# Patient Record
(demographics unavailable — no encounter records)

---

## 2024-11-04 NOTE — HISTORY OF PRESENT ILLNESS
[Influenza] : Influenza [FreeTextEntry1] :  Administered IM flu injection on left thigh. Pt tolerated well. VIS statement given.

## 2024-11-22 NOTE — DEVELOPMENTAL MILESTONES
[Normal Development] : Normal Development [Looks for hidden objects] : looks for hidden objects [Imitates new gestures] : imitates new gestures [Uses one word other than Mom or] : uses one word other than Mom or Dad or personal names [Follows a verbal command that] : follows a verbal command that includes a gesture [Drops object in a cup] : drops object in a cup [Picks up small object with 2 finger] : picks up small object with 2 finger pincer grasp [Picks up food and eats it] : picks up food and eats it [No] : Not Completed. [Says "Dad" or "Mom" with meaning] : does not say "Dad" or "Mom" with meaning [Takes first independent] : does not take first independent steps [Stands without support] : does not stand without support

## 2024-11-22 NOTE — PHYSICAL EXAM
[Alert] : alert [Normocephalic] : normocephalic [Closed Anterior [place]] : closed anterior fontanelle [Red Reflex] : red reflex bilateral [PERRL] : PERRL [Normally Placed Ears] : normally placed ears [Auricles Well Formed] : auricles well formed [Clear Tympanic membranes] : clear tympanic membranes [Light reflex present] : light reflex present [Bony landmarks visible] : bony landmarks visible [Discharge] : no discharge [Nares Patent] : nares patent [Palate Intact] : palate intact [Uvula Midline] : uvula midline [Tooth Eruption] : tooth eruption [Supple, full passive range of motion] : supple, full passive range of motion [Palpable Masses] : no palpable masses [Symmetric Chest Rise] : symmetric chest rise [Clear to Auscultation Bilaterally] : clear to auscultation bilaterally [Regular Rate and Rhythm] : regular rate and rhythm [S1, S2 present] : S1, S2 present [Murmurs] : no murmurs [+2 Femoral Pulses] : (+) 2 femoral pulses [Soft] : soft [Tender] : nontender [Distended] : nondistended [Bowel Sounds] : normoactive bowel sounds [Hepatomegaly] : no hepatomegaly [Splenomegaly] : no splenomegaly [Central Urethral Opening] : central urethral opening [Testicles Descended] : testicles descended bilaterally [No Abnormal Lymph Nodes Palpated] : no abnormal lymph nodes palpated [Symmetric Abduction and Rotation of Hips] : symmetric abduction and rotation of hips [Allis Sign] : negative Allis sign [Straight] : straight [Cranial Nerves Grossly Intact] : cranial nerves grossly intact [Rash or Lesions] : no rash/lesions

## 2024-11-22 NOTE — HISTORY OF PRESENT ILLNESS
[Mother] : mother [Father] : father [Formula ___ oz/feed] : [unfilled] oz of formula per feed [Cow's milk ___ oz/feed] : [unfilled] oz of Cow's milk per feed [Fruit] : fruit [Vegetables] : vegetables [Meat] : meat [Dairy] : dairy [___ stools per day] : [unfilled]  stools per day [Yellow] : stools are yellow color [___ voids per day] : [unfilled] voids per day [Normal] : Normal [On back] : On back [In crib] : In crib [Sippy cup use] : Sippy cup use [Brushing teeth] : Brushing teeth [No] : Not at  exposure [Water heater temperature set at <120 degrees F] : Water heater temperature set at <120 degrees F [Car seat in back seat] : Car seat in back seat [Smoke Detectors] : Smoke detectors [Carbon Monoxide Detectors] : Carbon monoxide detectors [NO] : No [PCV 20] : PCV 20 [Hepatitis A] : Hepatitis A [Exposure to electronic nicotine delivery system] : No exposure to electronic nicotine delivery system [At risk for exposure to TB] : Not at risk for exposure to Tuberculosis [de-identified] : 16 oz cows milk, 3 meals a day [FreeTextEntry3] : 12 hours [de-identified] : hasnt seen dentist yet, wipes teeth at night

## 2024-11-22 NOTE — END OF VISIT
[FreeTextEntry3] : Case seen, discussed and examined with Bennie OSMAN student CELINA Nunez Agree with plan Leonides Vences MD

## 2024-11-22 NOTE — DISCUSSION/SUMMARY
[Normal Growth] : growth [Normal Development] : development [None] : No known medical problems [No Elimination Concerns] : elimination [No Feeding Concerns] : feeding [No Skin Concerns] : skin [Normal Sleep Pattern] : sleep [Family Support] : family support [Establishing Routines] : establishing routines [Feeding and Appetite Changes] : feeding and appetite changes [Establishing A Dental Home] : establishing a dental home [Safety] : safety [No Medications] : ~He/She~ is not on any medications [Parent/Guardian] : parent/guardian [] : The components of the vaccine(s) to be administered today are listed in the plan of care. The disease(s) for which the vaccine(s) are intended to prevent and the risks have been discussed with the caretaker.  The risks are also included in the appropriate vaccination information statements which have been provided to the patient's caregiver.  The caregiver has given consent to vaccinate. [FreeTextEntry1] :  12mo M presenting for WCC. Growth and height in 60%tile and 69%tile respectively. No acute growth or developmental concerns. Discussed and counseled on components of healthy active living with family. Recommended to continue with 6-12oz of Cow's milk daily and d/c formula. Continue 3 meals a day with 2-3 snacks in-between. Parents advised to schedule appointment for Pt's first dental appointment. Parents instructed about brushing with toothpaste BID, or an alternative fluoride source. Discussed Pt's developmental milestones. Counseled and educated parent's on importance of reading stories to baby. Encourage walking with walker toys. Help baby to maintain consistent daily routines and sleep schedule. Recognize stranger anxiety. Ensure home is safe since baby is increasingly mobile. Be within arm's reach of baby at all times. Use consistent, positive discipline. Avoid screen time. Read aloud to baby.  Risks and benefits of vaccines administered today have been discussed with parents. Today Varicella and MMR were administered. Parents instructed to return before 15mo WCC to receive Hep A and PCV20. MOC & FOC verbalized understanding and agreement with all aspects of discussion and plan.  SDOH domains were screened and scored. Advised to schedule RTO for 15mo WCC.

## 2025-02-18 NOTE — PHYSICAL EXAM
[Alert] : alert [No Acute Distress] : no acute distress [Normocephalic] : normocephalic [Anterior [place] Closed] : anterior fontanelle closed [Red Reflex Bilateral] : red reflex bilateral [PERRL] : PERRL [Normally Placed Ears] : normally placed ears [Auricles Well Formed] : auricles well formed [Clear Tympanic membranes with present light reflex and bony landmarks] : clear tympanic membranes with present light reflex and bony landmarks [No Discharge] : no discharge [Nares Patent] : nares patent [Palate Intact] : palate intact [Uvula Midline] : uvula midline [Tooth Eruption] : tooth eruption  [Supple, full passive range of motion] : supple, full passive range of motion [No Palpable Masses] : no palpable masses [Symmetric Chest Rise] : symmetric chest rise [Clear to Auscultation Bilaterally] : clear to auscultation bilaterally [Regular Rate and Rhythm] : regular rate and rhythm [S1, S2 present] : S1, S2 present [No Murmurs] : no murmurs [+2 Femoral Pulses] : +2 femoral pulses [Soft] : soft [NonTender] : non tender [Non Distended] : non distended [Normoactive Bowel Sounds] : normoactive bowel sounds [No Hepatomegaly] : no hepatomegaly [No Splenomegaly] : no splenomegaly [Central Urethral Opening] : central urethral opening [Testicles Descended Bilaterally] : testicles descended bilaterally [Patent] : patent [Normally Placed] : normally placed [No Abnormal Lymph Nodes Palpated] : no abnormal lymph nodes palpated [No Clavicular Crepitus] : no clavicular crepitus [Negative Perdue-Ortalani] : negative Perdue-Ortalani [Symmetric Buttocks Creases] : symmetric buttocks creases [No Spinal Dimple] : no spinal dimple [NoTuft of Hair] : no tuft of hair [Cranial Nerves Grossly Intact] : cranial nerves grossly intact [de-identified] : dry patches of skin on thighs and R shoulder

## 2025-02-18 NOTE — DEVELOPMENTAL MILESTONES
[Drinks from cup with little] : drinks from cup with little spilling [Points to ask for something] : points to ask for something or to get help [Uses 3 words other than names] : uses 3 words other than names [Follows directions that do not] : follows direction that do not include a gesture [Begins to run] : begins to run [Drops object into and takes object] : drops object into and takes object out of container [Yes] : Completed. [FreeTextEntry1] : Survey of Wellbeing of Young Children (SWYC) at 15 months Developmental Milestones 12 (<9 refer to Early Intervention, 10 below average, 11-17 average, 18+ above average). Baby Pediatric Symptom Checklist 0 on inflexibility, 0 on irritability, 1 on routines (normal <3 on any domain)

## 2025-02-18 NOTE — HISTORY OF PRESENT ILLNESS
[Parents] : parents [Normal] : Normal [Brushing teeth] : Brushing teeth [Tap water] : Primary Fluoride Source: Tap water [No] : No cigarette smoke exposure [Car seat in back seat] : Car seat in back seat [Carbon Monoxide Detectors] : Carbon monoxide detectors [Smoke Detectors] : Smoke detectors [Up to date] : Up to date [NO] : No [Exposure to electronic nicotine delivery system] : No exposure to electronic nicotine delivery system [de-identified] : Eats all foods. Drinks water, milk 16 oz. [FreeTextEntry3] : Sleeps 8p-8a. Naps.

## 2025-02-18 NOTE — DISCUSSION/SUMMARY
[Normal Growth] : growth [Normal Development] : development [None] : No known medical problems [No Elimination Concerns] : elimination [No Feeding Concerns] : feeding [No Skin Concerns] : skin [Normal Sleep Pattern] : sleep [Communication and Social Development] : communication and social development [Sleep Routines and Issues] : sleep routines and issues [Temper Tantrums and Discipline] : temper tantrums and discipline [Healthy Teeth] : healthy teeth [Safety] : safety [No Medications] : ~He/She~ is not on any medications [Parent/Guardian] : parent/guardian [] : The components of the vaccine(s) to be administered today are listed in the plan of care. The disease(s) for which the vaccine(s) are intended to prevent and the risks have been discussed with the caretaker.  The risks are also included in the appropriate vaccination information statements which have been provided to the patient's caregiver.  The caregiver has given consent to vaccinate. [FreeTextEntry1] :  15 month old here for [facility] Doing well Vaccines - DTaP, HIB  Dry skin- Take short baths (less than 5 minutes) and avoid hot water, 2-3x/week, use soap right at the end, minimal amount - only on areas where truly needed. Use gentle cleanser such as Aveeno fragrance free. No bubble baths. Child may play in plain bath water for 2-3 minutes, then use soap/shampoo, and take child out of bath immediately. Do not let child sit in sudsy bath water. Pat dry only after coming out of bath/shower. Moisturize right after bath time with plain fragrance-free emollient such as plain Vaseline or CeraVe. Reapply moisturizer frequently throughout the day, at least 3-4x/day. Apply plain Vaseline to face prior to mealtimes. Use detergent such as All Free and Clear or Tide Free and Gentle. Do not use fabric softener.  Continue whole cow's milk. Continue table foods, 3 meals with 2-3 snacks per day.  Incorporate fluorinated water daily in a sippy cup.  Brush teeth twice a day with soft toothbrush. Recommend visit to dentist.  When in car, keep child in rear-facing car seats until age 2, or until  the maximum height and weight for seat is reached.  Put baby to sleep in own crib. Lower crib mattress.  Help baby to maintain consistent daily routines and sleep schedule.  Recognize stranger and separation anxiety.  Ensure home is safe since baby is increasingly mobile.  Be within arm's reach of baby at all times.  Use consistent, positive discipline.  Read aloud to baby.   All questions answered. RTC in 3 months for [facility]

## 2025-05-23 NOTE — DEVELOPMENTAL MILESTONES
[Normal Development] : Normal Development [None] : none [Help dress and undress self] : help dress and undress self [Points to pictures in book] : points to pictures in book [Points to object of interest to] : points to object of interest to draw attention to it [Turns and looks at adult if] : turns and looks at adult if something new happens [Begins to scoop with spoon] : begins to scoop with spoon [Uses 6 to 10 words other than] : uses 6 to 10 words other than names [Identifies at least 2 body parts] : identifies at least 2 body parts [Sits in small chair] : sits in small chair [Carries toy while walking] : carries toy while walking [Scribbles spontaneously] : scribbles spontaneously [Throws small ball a few feet] : throws a small ball a few feet while standing [Passed] : passed [Walks up with 2 feet per step] : does not walk up with 2 feet per step with hand held [FreeTextEntry1] : 2

## 2025-05-23 NOTE — HISTORY OF PRESENT ILLNESS
[Mother] : mother [Father] : father [Cow's milk (Ounces per day ___)] : consumes [unfilled] oz of Cow's milk per day [Fruit] : fruit [Vegetables] : vegetables [Meat] : meat [Eggs] : eggs [Finger Foods] : finger foods [Table food] : table food [Normal] : Normal [Sippy cup use] : Sippy cup use [Brushing teeth] : Brushing teeth [Toothpaste] : Primary Fluoride Source: Toothpaste [Playtime] : Playtime  [Ready for Toilet Training] : ready for toilet training [No] : Not at  exposure [Car seat in back seat] : Car seat in back seat [Carbon Monoxide Detectors] : Carbon monoxide detectors [Smoke Detectors] : Smoke detectors [Up to date] : Up to date [NO] : No [Exposure to electronic nicotine delivery system] : No exposure to electronic nicotine delivery system [FreeTextEntry1] : Edwina is an 18m M here for well child visit. Growing and developing appropriately. Parents state he has become more of a picky eater recently, but is still eating a variety of foods. Still drinking 12-16 oz of milk per day, and using bottle once per day. Recommended weaning bottle by age 2.  Continue whole cow's milk. Continue table foods, 3 meals with 2-3 snacks per day. Incorporate fluorinated water daily in a sippy cup. Brush teeth twice a day with soft toothbrush. Recommend visit to dentist. When in car, keep child in rear-facing car seats until age 2, or until  the maximum height and weight for seat is reached. Put toddler to sleep in own bed or crib. Help toddler to maintain consistent daily routines and sleep schedule. Toilet training discussed. Recognize anxiety in new settings. Ensure home is safe. Be within arm's reach of toddler at all times. Use consistent, positive discipline. Read aloud to toddler.

## 2025-05-23 NOTE — PHYSICAL EXAM
[Alert] : alert [No Acute Distress] : no acute distress [Normocephalic] : normocephalic [Anterior [place] Closed] : anterior fontanelle closed [Red Reflex Bilateral] : red reflex bilateral [PERRL] : PERRL [Normally Placed Ears] : normally placed ears [Auricles Well Formed] : auricles well formed [Clear Tympanic membranes with present light reflex and bony landmarks] : clear tympanic membranes with present light reflex and bony landmarks [No Discharge] : no discharge [Nares Patent] : nares patent [Palate Intact] : palate intact [Tooth Eruption] : tooth eruption  [Supple, full passive range of motion] : supple, full passive range of motion [No Palpable Masses] : no palpable masses [Symmetric Chest Rise] : symmetric chest rise [Clear to Auscultation Bilaterally] : clear to auscultation bilaterally [Regular Rate and Rhythm] : regular rate and rhythm [S1, S2 present] : S1, S2 present [No Murmurs] : no murmurs [+2 Femoral Pulses] : +2 femoral pulses [Soft] : soft [NonTender] : non tender [Non Distended] : non distended [Normoactive Bowel Sounds] : normoactive bowel sounds [Testicles Descended Bilaterally] : testicles descended bilaterally [No Abnormal Lymph Nodes Palpated] : no abnormal lymph nodes palpated [No Clavicular Crepitus] : no clavicular crepitus [Symmetric Buttocks Creases] : symmetric buttocks creases [No Spinal Dimple] : no spinal dimple [NoTuft of Hair] : no tuft of hair [Cranial Nerves Grossly Intact] : cranial nerves grossly intact [No Rash or Lesions] : no rash or lesions

## 2025-05-23 NOTE — DISCUSSION/SUMMARY
-on antibiotics [Normal Growth] : growth [Normal Development] : development [None] : No known medical problems [No Elimination Concerns] : elimination [No Skin Concerns] : skin [Normal Sleep Pattern] : sleep [Picky Eater] : picky eater [Family Support] : family support [Child Development and Behavior] : child development and behavior [Language Promotion/Hearing] : language promotion/hearing [Toliet Training Readiness] : toliet training readiness [Safety] : safety [No Medications] : ~He/She~ is not on any medications [Mother] : mother [Father] : father [FreeTextEntry1] : Edwina is an 18m M here for well child visit. Growing and developing appropriately. Parents state he has become more of a picky eater recently, but is still eating a variety of foods. Still drinking 12-16 oz of milk per day, and using bottle once per day. Recommended weaning bottle by age 2.  Continue whole cow's milk. Continue table foods, 3 meals with 2-3 snacks per day. Incorporate fluorinated water daily in a sippy cup. Brush teeth twice a day with soft toothbrush. Recommend visit to dentist. When in car, keep child in rear-facing car seats until age 2, or until the maximum height and weight for seat is reached. Put toddler to sleep in own bed or crib. Help toddler to maintain consistent daily routines and sleep schedule. Toilet training discussed. Recognize anxiety in new settings. Ensure home is safe. Be within arm's reach of toddler at all times. Use consistent, positive discipline. Read aloud to toddler.